# Patient Record
Sex: MALE | Employment: UNEMPLOYED
[De-identification: names, ages, dates, MRNs, and addresses within clinical notes are randomized per-mention and may not be internally consistent; named-entity substitution may affect disease eponyms.]

---

## 2022-04-21 ENCOUNTER — OFFICE VISIT (OUTPATIENT)
Dept: INTERNAL MEDICINE | Facility: OTHER | Age: 27
End: 2022-04-21
Payer: MEDICAID

## 2022-04-21 VITALS
SYSTOLIC BLOOD PRESSURE: 120 MMHG | HEART RATE: 65 BPM | WEIGHT: 191 LBS | BODY MASS INDEX: 27.35 KG/M2 | OXYGEN SATURATION: 96 % | TEMPERATURE: 98.1 F | DIASTOLIC BLOOD PRESSURE: 79 MMHG | HEIGHT: 70 IN

## 2022-04-21 DIAGNOSIS — R22.31 NODULE OF FINGER OF RIGHT HAND: ICD-10-CM

## 2022-04-21 DIAGNOSIS — E66.3 OVERWEIGHT: ICD-10-CM

## 2022-04-21 DIAGNOSIS — Z00.00 ANNUAL PHYSICAL EXAM: ICD-10-CM

## 2022-04-21 DIAGNOSIS — H35.60: ICD-10-CM

## 2022-04-21 DIAGNOSIS — Z87.891 HISTORY OF SMOKING FOR 6-10 YEARS: ICD-10-CM

## 2022-04-21 PROCEDURE — 99204 OFFICE O/P NEW MOD 45 MIN: CPT | Mod: GC | Performed by: STUDENT IN AN ORGANIZED HEALTH CARE EDUCATION/TRAINING PROGRAM

## 2022-04-21 ASSESSMENT — ENCOUNTER SYMPTOMS
EYE DISCHARGE: 0
DEPRESSION: 0
BLURRED VISION: 0
WEIGHT LOSS: 0
BACK PAIN: 0
SENSORY CHANGE: 0
PHOTOPHOBIA: 0
WEAKNESS: 1
EYE PAIN: 0
DOUBLE VISION: 0
EYE REDNESS: 0

## 2022-04-21 ASSESSMENT — PATIENT HEALTH QUESTIONNAIRE - PHQ9: CLINICAL INTERPRETATION OF PHQ2 SCORE: 0

## 2022-04-21 ASSESSMENT — LIFESTYLE VARIABLES: SUBSTANCE_ABUSE: 0

## 2022-04-21 NOTE — ASSESSMENT & PLAN NOTE
Patient had piece of metal that got in his eye, needed retinal surgery 1/10/2022 [facility] in [place]  -recommended patient get a PCP    Plan:  -Ophtho referral placed to [facility]

## 2022-04-21 NOTE — PROGRESS NOTES
New Patient    Shaheen Bone is a 26 y.o. Marshallese-speaking male with a PMHx of overweight, prior smoker, and traumatic left eye damage s/p retinal surgery 1/2022 who presents today to establish care.    HPI:     Left Eye:  -Patient states that a piece of metal flew into his eye after work and caused significant damage, he had to go to  Retina and they performed surgery to fix it. However, to continue there he was recommended to establish with a PCP for a referral. His vision is doing well now and he has no complaints.    Finger:  -Patient complains of a nodule in his right third finger over the lateral palmar aspect of the proximal finger over the last several months that initially was causing a trigger finger type inability to extend the hand, but has since decreased in both pain and size and now has no limited ROM and only has pain to compression of the nodule.    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 Additional pertinent symptoms as noted below.    Review of Systems   Constitutional: Negative for malaise/fatigue and weight loss.   Eyes: Negative for blurred vision, double vision, photophobia, pain, discharge and redness.   Musculoskeletal: Negative for back pain and joint pain.   Skin: Negative for itching and rash.   Neurological: Positive for weakness. Negative for sensory change.   Psychiatric/Behavioral: Negative for depression and substance abuse.       Patient Active Problem List    Diagnosis Date Noted   • Traumatic hemorrhage of retina s/p retinal surgery 1/2022 04/21/2022   • Nodule of finger of right hand 04/21/2022          No current outpatient medications on file.     No current facility-administered medications for this visit.     Social History  "    Socioeconomic History   • Marital status: Unknown     Spouse name: Not on file   • Number of children: Not on file   • Years of education: Not on file   • Highest education level: Not on file   Occupational History   • Not on file   Tobacco Use   • Smoking status: Former Smoker     Packs/day: 1.00     Years: 7.00     Pack years: 7.00     Types: Cigarettes   • Smokeless tobacco: Never Used   • Tobacco comment: quit 2019   Vaping Use   • Vaping Use: Never used   Substance and Sexual Activity   • Alcohol use: Never   • Drug use: Never   • Sexual activity: Not on file   Other Topics Concern   • Not on file   Social History Narrative   • Not on file     Social Determinants of Health     Financial Resource Strain: Not on file   Food Insecurity: Not on file   Transportation Needs: Not on file   Physical Activity: Not on file   Stress: Not on file   Social Connections: Not on file   Intimate Partner Violence: Not on file   Housing Stability: Not on file   -, lives in [facility],  with 2 boys at 8 and 3 years old    Family History   Problem Relation Age of Onset   • Thyroid Mother         abnormality, unknown type of problem   • Cancer Father         skin cancer on his foot that was removed, unknown type   • No Known Problems Sister    • No Known Problems Son      Patient has no known allergies.    /79 (BP Location: Left arm, Patient Position: Sitting, BP Cuff Size: Adult)   Pulse 65   Temp 36.7 °C (98.1 °F) (Temporal)   Ht 1.765 m (5' 9.5\")   Wt 86.6 kg (191 lb)   SpO2 96%   BMI 27.80 kg/m²     Physical Exam  Physical Exam  Constitutional:       General: He is not in acute distress.     Appearance: Normal appearance.   HENT:      Head: Normocephalic and atraumatic.      Mouth/Throat:      Mouth: Mucous membranes are moist.      Pharynx: Oropharynx is clear. No oropharyngeal exudate or posterior oropharyngeal erythema.   Eyes:      Extraocular Movements: Extraocular movements intact.      " Conjunctiva/sclera: Conjunctivae normal.      Pupils: Pupils are equal, round, and reactive to light.      Comments: No abnormality post surgically   Neck:      Comments: No thyromegaly  Cardiovascular:      Rate and Rhythm: Normal rate and regular rhythm.      Heart sounds: No murmur heard.    No gallop.   Pulmonary:      Effort: Pulmonary effort is normal. No respiratory distress.      Breath sounds: Normal breath sounds. No wheezing or rales.   Abdominal:      General: Abdomen is flat. Bowel sounds are normal. There is no distension.      Palpations: Abdomen is soft.      Tenderness: There is no abdominal tenderness.   Musculoskeletal:         General: Deformity (Patient's finger is slightly curved because of the pull on his third finger from the nodule but he has no limited ROM, tender only to compression, can feel the nodule moving up and down with finger F/E) present. No swelling. Normal range of motion.      Cervical back: Neck supple. No tenderness.   Lymphadenopathy:      Cervical: No cervical adenopathy.   Skin:     General: Skin is warm and dry.      Capillary Refill: Capillary refill takes less than 2 seconds.      Findings: No erythema or rash.   Neurological:      General: No focal deficit present.      Mental Status: He is alert and oriented to person, place, and time.   Psychiatric:         Mood and Affect: Mood normal.         Behavior: Behavior normal.          Assessment and Plan    Traumatic hemorrhage of retina s/p retinal surgery 1/2022  Patient had piece of metal that got in his eye, needed retinal surgery 1/10/2022 [facility] in [place]  -recommended patient get a PCP    Plan:  -Ophtho referral placed to [facility]    Nodule of finger of right hand  6 months, initially in the morning issues opening hand without inciting trauma, now better but has a nodule in his right hand third finger  -likely a nodule along the tendon sheath, feels less like ganglion cyst. Likely from repetitive trauma at work  and not affecting functioning    Plan:  -Continue to monitor, If affecting functioning will refer to ortho for removal/injection    Overweight  BMI >25  -discussed weight loss, patient exercises. Did not discuss diet at length, if continues to have elevated weight would benefit from in depth discussion    Annual physical exam  Patient appears very healthy  -need baseline labs to establish no underlying disease process, will start with CBC, CMP, lipid profile  -no thyroid labs at this time as asymptomatic and well    Follow up: 6 months to establish with new resident, sooner if lab abnormality    Signed by: Lopez Benitez D.O.

## 2022-04-21 NOTE — ASSESSMENT & PLAN NOTE
6 months, initially in the morning issues opening hand without inciting trauma, now better but has a nodule in his right hand third finger  -likely a nodule along the tendon sheath, feels less like ganglion cyst. Likely from repetitive trauma at work and not affecting functioning    Plan:  -Continue to monitor, If affecting functioning will refer to ortho for removal/injection

## 2022-04-21 NOTE — PATIENT INSTRUCTIONS
Puedes volver en seis meses por hablar sobre tus problemas y establecer con un otro residente aqui.    Por favor, si puedes hacer en analisis de la cherelle en la proxima semana, puedo enviar un mensaje por my chart si es normal, voy a llamar si es abnormal.    Con el dedo y la ansiedad, vamos a observar si estas problems cambiar en frequencia o intensidad. Si tienes problems, llamar a nosotros para mic cruz por discutirlo.

## 2022-04-22 DIAGNOSIS — E66.3 OVERWEIGHT: ICD-10-CM

## 2022-04-22 DIAGNOSIS — Z00.00 ANNUAL PHYSICAL EXAM: ICD-10-CM

## 2022-04-22 NOTE — ASSESSMENT & PLAN NOTE
Patient appears very healthy  -need baseline labs to establish no underlying disease process, will start with CBC, CMP, lipid profile  -no thyroid labs at this time as asymptomatic and well

## 2023-04-25 ENCOUNTER — APPOINTMENT (OUTPATIENT)
Dept: URGENT CARE | Facility: PHYSICIAN GROUP | Age: 28
End: 2023-04-25
Payer: MEDICAID

## 2023-04-26 ENCOUNTER — OFFICE VISIT (OUTPATIENT)
Dept: URGENT CARE | Facility: CLINIC | Age: 28
End: 2023-04-26
Payer: MEDICAID

## 2023-04-26 ENCOUNTER — APPOINTMENT (OUTPATIENT)
Dept: RADIOLOGY | Facility: MEDICAL CENTER | Age: 28
End: 2023-04-26
Attending: EMERGENCY MEDICINE
Payer: MEDICAID

## 2023-04-26 ENCOUNTER — HOSPITAL ENCOUNTER (EMERGENCY)
Facility: MEDICAL CENTER | Age: 28
End: 2023-04-26
Attending: EMERGENCY MEDICINE
Payer: MEDICAID

## 2023-04-26 VITALS
HEART RATE: 102 BPM | BODY MASS INDEX: 27.75 KG/M2 | RESPIRATION RATE: 14 BRPM | OXYGEN SATURATION: 97 % | SYSTOLIC BLOOD PRESSURE: 102 MMHG | HEIGHT: 70 IN | DIASTOLIC BLOOD PRESSURE: 74 MMHG | TEMPERATURE: 97.9 F | WEIGHT: 193.8 LBS

## 2023-04-26 VITALS
WEIGHT: 194.89 LBS | SYSTOLIC BLOOD PRESSURE: 126 MMHG | OXYGEN SATURATION: 99 % | RESPIRATION RATE: 16 BRPM | TEMPERATURE: 97.1 F | BODY MASS INDEX: 27.9 KG/M2 | DIASTOLIC BLOOD PRESSURE: 84 MMHG | HEART RATE: 93 BPM | HEIGHT: 70 IN

## 2023-04-26 DIAGNOSIS — R10.31 RIGHT LOWER QUADRANT ABDOMINAL PAIN: ICD-10-CM

## 2023-04-26 DIAGNOSIS — F41.1 GENERALIZED ANXIETY DISORDER: ICD-10-CM

## 2023-04-26 DIAGNOSIS — R10.31 RIGHT LOWER QUADRANT PAIN: Primary | ICD-10-CM

## 2023-04-26 DIAGNOSIS — Z00.00 HEALTH CARE MAINTENANCE: ICD-10-CM

## 2023-04-26 LAB
ALBUMIN SERPL BCP-MCNC: 4.6 G/DL (ref 3.2–4.9)
ALBUMIN/GLOB SERPL: 1.4 G/DL
ALP SERPL-CCNC: 126 U/L (ref 30–99)
ALT SERPL-CCNC: 23 U/L (ref 2–50)
ANION GAP SERPL CALC-SCNC: 12 MMOL/L (ref 7–16)
APPEARANCE UR: CLEAR
AST SERPL-CCNC: 18 U/L (ref 12–45)
BASOPHILS # BLD AUTO: 0.4 % (ref 0–1.8)
BASOPHILS # BLD: 0.04 K/UL (ref 0–0.12)
BILIRUB SERPL-MCNC: 0.4 MG/DL (ref 0.1–1.5)
BILIRUB UR QL STRIP.AUTO: NEGATIVE
BUN SERPL-MCNC: 17 MG/DL (ref 8–22)
CALCIUM ALBUM COR SERPL-MCNC: 8.9 MG/DL (ref 8.5–10.5)
CALCIUM SERPL-MCNC: 9.4 MG/DL (ref 8.5–10.5)
CHLORIDE SERPL-SCNC: 103 MMOL/L (ref 96–112)
CO2 SERPL-SCNC: 23 MMOL/L (ref 20–33)
COLOR UR: YELLOW
CREAT SERPL-MCNC: 0.91 MG/DL (ref 0.5–1.4)
EOSINOPHIL # BLD AUTO: 0.04 K/UL (ref 0–0.51)
EOSINOPHIL NFR BLD: 0.4 % (ref 0–6.9)
ERYTHROCYTE [DISTWIDTH] IN BLOOD BY AUTOMATED COUNT: 40.6 FL (ref 35.9–50)
GFR SERPLBLD CREATININE-BSD FMLA CKD-EPI: 117 ML/MIN/1.73 M 2
GLOBULIN SER CALC-MCNC: 3.2 G/DL (ref 1.9–3.5)
GLUCOSE SERPL-MCNC: 89 MG/DL (ref 65–99)
GLUCOSE UR STRIP.AUTO-MCNC: NEGATIVE MG/DL
HCT VFR BLD AUTO: 49.2 % (ref 42–52)
HGB BLD-MCNC: 16.5 G/DL (ref 14–18)
IMM GRANULOCYTES # BLD AUTO: 0.02 K/UL (ref 0–0.11)
IMM GRANULOCYTES NFR BLD AUTO: 0.2 % (ref 0–0.9)
KETONES UR STRIP.AUTO-MCNC: NEGATIVE MG/DL
LEUKOCYTE ESTERASE UR QL STRIP.AUTO: NEGATIVE
LIPASE SERPL-CCNC: 28 U/L (ref 11–82)
LYMPHOCYTES # BLD AUTO: 2.33 K/UL (ref 1–4.8)
LYMPHOCYTES NFR BLD: 20.7 % (ref 22–41)
MCH RBC QN AUTO: 30.1 PG (ref 27–33)
MCHC RBC AUTO-ENTMCNC: 33.5 G/DL (ref 33.7–35.3)
MCV RBC AUTO: 89.8 FL (ref 81.4–97.8)
MICRO URNS: NORMAL
MONOCYTES # BLD AUTO: 0.77 K/UL (ref 0–0.85)
MONOCYTES NFR BLD AUTO: 6.9 % (ref 0–13.4)
NEUTROPHILS # BLD AUTO: 8.03 K/UL (ref 1.82–7.42)
NEUTROPHILS NFR BLD: 71.4 % (ref 44–72)
NITRITE UR QL STRIP.AUTO: NEGATIVE
NRBC # BLD AUTO: 0 K/UL
NRBC BLD-RTO: 0 /100 WBC
PH UR STRIP.AUTO: 5 [PH] (ref 5–8)
PLATELET # BLD AUTO: 247 K/UL (ref 164–446)
PMV BLD AUTO: 9.9 FL (ref 9–12.9)
POTASSIUM SERPL-SCNC: 3.8 MMOL/L (ref 3.6–5.5)
PROT SERPL-MCNC: 7.8 G/DL (ref 6–8.2)
PROT UR QL STRIP: NEGATIVE MG/DL
RBC # BLD AUTO: 5.48 M/UL (ref 4.7–6.1)
RBC UR QL AUTO: NEGATIVE
SODIUM SERPL-SCNC: 138 MMOL/L (ref 135–145)
SP GR UR STRIP.AUTO: >=1.03
UROBILINOGEN UR STRIP.AUTO-MCNC: 0.2 MG/DL
WBC # BLD AUTO: 11.2 K/UL (ref 4.8–10.8)

## 2023-04-26 PROCEDURE — 74177 CT ABD & PELVIS W/CONTRAST: CPT

## 2023-04-26 PROCEDURE — 80053 COMPREHEN METABOLIC PANEL: CPT

## 2023-04-26 PROCEDURE — 36415 COLL VENOUS BLD VENIPUNCTURE: CPT

## 2023-04-26 PROCEDURE — 81003 URINALYSIS AUTO W/O SCOPE: CPT

## 2023-04-26 PROCEDURE — 99284 EMERGENCY DEPT VISIT MOD MDM: CPT

## 2023-04-26 PROCEDURE — 83690 ASSAY OF LIPASE: CPT

## 2023-04-26 PROCEDURE — 99214 OFFICE O/P EST MOD 30 MIN: CPT | Performed by: NURSE PRACTITIONER

## 2023-04-26 PROCEDURE — 700117 HCHG RX CONTRAST REV CODE 255: Performed by: EMERGENCY MEDICINE

## 2023-04-26 PROCEDURE — 85025 COMPLETE CBC W/AUTO DIFF WBC: CPT

## 2023-04-26 RX ADMIN — IOHEXOL 100 ML: 350 INJECTION, SOLUTION INTRAVENOUS at 19:50

## 2023-04-26 ASSESSMENT — ENCOUNTER SYMPTOMS
ABDOMINAL PAIN: 1
CHILLS: 0
FLANK PAIN: 0
FEVER: 0

## 2023-04-26 NOTE — PROGRESS NOTES
"Subjective:     Shaheen Bone is a 27 y.o. male who presents for Abdominal Pain (Lower R side of abdomen x 1 week.  Pt. Would like a referral to Mental Health for Anxiety and PCP for care. )      Abdominal Pain  Pertinent negatives include no dysuria, fever, frequency or hematuria.   Pt presents for evaluation of a new problem.  Shaheen is a very pleasant 27-year-old male presents urgent care today with complaints of right lower quadrant abdominal pain that has been ongoing for the past 1 week.  He does note that his testicles are moving up and down.  His pain is rated as a 3/10.  He denies any nausea, vomiting, diarrhea, fever or chills.  He has not use any medication for the relief of his discomfort.   Review of Systems   Constitutional:  Negative for chills and fever.   Gastrointestinal:  Positive for abdominal pain.   Genitourinary:  Negative for dysuria, flank pain, frequency, hematuria and urgency.     PMH: No past medical history on file.  ALLERGIES: No Known Allergies  SURGHX: No past surgical history on file.  SOCHX:   Social History     Socioeconomic History    Marital status: Unknown   Tobacco Use    Smoking status: Former     Packs/day: 1.00     Years: 7.00     Pack years: 7.00     Types: Cigarettes    Smokeless tobacco: Never    Tobacco comments:     quit 2019   Vaping Use    Vaping Use: Never used   Substance and Sexual Activity    Alcohol use: Never    Drug use: Never     FH:   Family History   Problem Relation Age of Onset    Thyroid Mother         abnormality, unknown type of problem    Cancer Father         skin cancer on his foot that was removed, unknown type    No Known Problems Sister     No Known Problems Son          Objective:   /74   Pulse (!) 102   Temp 36.6 °C (97.9 °F) (Temporal)   Resp 14   Ht 1.778 m (5' 10\")   Wt 87.9 kg (193 lb 12.8 oz)   SpO2 97%   BMI 27.81 kg/m²     Physical Exam  Vitals and nursing note reviewed.   Constitutional:       General: He is not in " acute distress.     Appearance: Normal appearance. He is normal weight. He is not ill-appearing or toxic-appearing.   HENT:      Head: Normocephalic.      Right Ear: External ear normal.      Left Ear: External ear normal.      Nose: Nose normal.      Mouth/Throat:      Mouth: Mucous membranes are moist.   Eyes:      General:         Right eye: No discharge.         Left eye: No discharge.      Extraocular Movements: Extraocular movements intact.      Conjunctiva/sclera: Conjunctivae normal.      Pupils: Pupils are equal, round, and reactive to light.   Pulmonary:      Effort: Pulmonary effort is normal.      Breath sounds: Normal breath sounds.   Abdominal:      General: Abdomen is flat.      Tenderness: There is abdominal tenderness in the right lower quadrant. There is no guarding.   Musculoskeletal:         General: Normal range of motion.      Cervical back: Normal range of motion and neck supple. No rigidity.   Lymphadenopathy:      Cervical: No cervical adenopathy.   Skin:     General: Skin is warm and dry.   Neurological:      General: No focal deficit present.      Mental Status: He is alert and oriented to person, place, and time. Mental status is at baseline.   Psychiatric:         Mood and Affect: Mood normal.         Behavior: Behavior normal.         Judgment: Judgment normal.       Assessment/Plan:   Assessment    1. Right lower quadrant pain  US-APPENDIX    US-INGUINAL HERNIA      2. Health care maintenance  Referral back to [facility] PCP      3. Generalized anxiety disorder  Referral to Behavioral Health      Patient was referred to follow-up with behavioral health and PCP for healthcare maintenance and anxiety per request.  Ultrasound ordered stat for evaluation of hernia and or appendicitis.  I will notify him of results.  He is in agreement with plan of care today and  was used for the duration of his visit.    AVS handout given and reviewed with patient. Pt educated on red flags and when  to seek treatment back in ER or UC.

## 2023-04-27 NOTE — ED NOTES
Assumed care of patient. Bedside report received from Karen HAYES. Pt resting in bed. States pain is tolerable.    PST RN Clinic Note     Patient educated to increase physical activity by at least 15 minutes, 3 times a week prior to surgery. Activity log given to patient. Patient states plan for increasing activity includes : Encouraged patient to walk 15 minutes daily until surgery.    Patient educated and instructed in stir-up regimen: deep breathing exercises, coughing, positioning, mobilization and pain management.  Instructed patient on use of Incentive Spirometer (IS) to use 10 times per hour while awake for respiratory management post-operatively.      Preoperative Chlorhexidine Gluconate skin prep (CHG wash) given to patient with instructions to use evening before surgery.    High carbohydrate pre-surgical drink (Ensure Clear) given to patient.  Verbal and written instructions reviewed to drink 1 hour prior to arriving to hospital day of surgery.     CLEAR program   Reviewed program with patient to prevent post-op pneumonia:   C- Clean mouth, brush and floss, use Chlorhexidine Gluconate (CHG) mouth wash x 3 days  L- Lungs ( IS and direct cough & deep breathing hourly)  E- Elevate HOB 30 degrees  A- Ambulate & elevate feet, eat meals in chair  R- Report document bundle    Reviewed pre-op surgical education with patient and family on:  -Fasting time  -Diet and nutrition goals  -Pain control  -Mobility  -Showering  -Deep breathing and coughing  -Discharge criteria    Procedural questions from patient addressed      Discharge planning discussed with patient   The Day Before Discharge Reminders:  1. Notify the person picking you up when you expect to go home.  2. Send home any extra personal items and flowers with family or friends.  3. Goal is to discharge by 11:00 a.m. whenever possible.  Emphasis placed on support system including responsible adult to assist patient in care when discharged from hospital.          Patient's plan includes: Home with spouse

## 2023-04-27 NOTE — ED NOTES
AVS discussed with patient via . Ambulatory with steady gait to lobby. Return precautions discussed

## 2023-04-27 NOTE — ED TRIAGE NOTES
Chief Complaint   Patient presents with    Abdominal Pain     C/o rlg abdominal pain x 4 days. Describes pain as pressure. Rates as 3/10. Denies dysuria. Denies n/v/d. Sent here from urgent care to get US and for further evaluation     NAD.   Vitals:    04/26/23 1717   BP: (!) 146/82   Pulse: 91   Resp: 16   Temp: 36.2 °C (97.1 °F)   SpO2: 97%     [place] speaking only, ipad  used [employer] Twitter Dashboard [id number]

## 2023-04-27 NOTE — ED PROVIDER NOTES
ED Provider Note    CHIEF COMPLAINT  Chief Complaint   Patient presents with    Abdominal Pain     C/o rlg abdominal pain x 4 days. Describes pain as pressure. Rates as 3/10. Denies dysuria. Denies n/v/d. Sent here from urgent care to get US and for further evaluation       EXTERNAL RECORDS REVIEWED  Other reviewed the patient's note from the urgent care today as the patient presented there with right lower quadrant discomfort.  They recommended an ultrasound to look for appendicitis as well as a potential inguinal hernia.    HPI/ROS  LIMITATION TO HISTORY   Select: Language utilize the iPad ,  Used       Shaheen Sulaiman Bone is a 27 y.o. male who presents with right lower quadrant abdominal pain.  The patient states the pain has been progressively getting worse over the last 3 to 4 days.  He is unaware of any exacerbating or relieving factors.  Describes the pain as sharp.  He does not have any dysuria nor hematuria.  He has not had any diarrhea.  He does not have any nausea or vomiting.  He has not had any prior abdominal surgeries.    PAST MEDICAL HISTORY       SURGICAL HISTORY  patient denies any surgical history    FAMILY HISTORY  Family History   Problem Relation Age of Onset    Thyroid Mother         abnormality, unknown type of problem    Cancer Father         skin cancer on his foot that was removed, unknown type    No Known Problems Sister     No Known Problems Son        SOCIAL HISTORY  Social History     Tobacco Use    Smoking status: Former     Packs/day: 1.00     Years: 7.00     Pack years: 7.00     Types: Cigarettes    Smokeless tobacco: Never    Tobacco comments:     quit 2019   Vaping Use    Vaping Use: Never used   Substance and Sexual Activity    Alcohol use: Never    Drug use: Never    Sexual activity: Not on file       CURRENT MEDICATIONS  Home Medications       Reviewed by Hina Mcconnell R.N. (Registered Nurse) on 04/26/23 at 6848  Med List Status: Partial     Medication  "Last Dose Status        Patient Carson Taking any Medications                           ALLERGIES  No Known Allergies    PHYSICAL EXAM  VITAL SIGNS: BP (!) 146/82   Pulse 91   Temp 36.2 °C (97.1 °F) (Temporal)   Resp 16   Ht 1.778 m (5' 10\")   Wt 88.4 kg (194 lb 14.2 oz)   SpO2 97%   BMI 27.96 kg/m²    General the patient does not appear toxic    HEENT unremarkable    Pulmonary chest clear to auscultation bilaterally    Cardiovascular S1-S2 with a regular rate and rhythm    GI the patient has right lower quadrant discomfort to deep palpation, no rebound, no guarding, no distention    Skin no pallor or cyanosis    Extremities atraumatic    Neurologic examination is grossly intact    DIAGNOSTIC STUDIES   Results for orders placed or performed during the hospital encounter of 04/26/23   CBC WITH DIFFERENTIAL   Result Value Ref Range    WBC 11.2 (H) 4.8 - 10.8 K/uL    RBC 5.48 4.70 - 6.10 M/uL    Hemoglobin 16.5 14.0 - 18.0 g/dL    Hematocrit 49.2 42.0 - 52.0 %    MCV 89.8 81.4 - 97.8 fL    MCH 30.1 27.0 - 33.0 pg    MCHC 33.5 (L) 33.7 - 35.3 g/dL    RDW 40.6 35.9 - 50.0 fL    Platelet Count 247 164 - 446 K/uL    MPV 9.9 9.0 - 12.9 fL    Neutrophils-Polys 71.40 44.00 - 72.00 %    Lymphocytes 20.70 (L) 22.00 - 41.00 %    Monocytes 6.90 0.00 - 13.40 %    Eosinophils 0.40 0.00 - 6.90 %    Basophils 0.40 0.00 - 1.80 %    Immature Granulocytes 0.20 0.00 - 0.90 %    Nucleated RBC 0.00 /100 WBC    Neutrophils (Absolute) 8.03 (H) 1.82 - 7.42 K/uL    Lymphs (Absolute) 2.33 1.00 - 4.80 K/uL    Monos (Absolute) 0.77 0.00 - 0.85 K/uL    Eos (Absolute) 0.04 0.00 - 0.51 K/uL    Baso (Absolute) 0.04 0.00 - 0.12 K/uL    Immature Granulocytes (abs) 0.02 0.00 - 0.11 K/uL    NRBC (Absolute) 0.00 K/uL   COMP METABOLIC PANEL   Result Value Ref Range    Sodium 138 135 - 145 mmol/L    Potassium 3.8 3.6 - 5.5 mmol/L    Chloride 103 96 - 112 mmol/L    Co2 23 20 - 33 mmol/L    Anion Gap 12.0 7.0 - 16.0    Glucose 89 65 - 99 mg/dL    Bun 17 " 8 - 22 mg/dL    Creatinine 0.91 0.50 - 1.40 mg/dL    Calcium 9.4 8.5 - 10.5 mg/dL    AST(SGOT) 18 12 - 45 U/L    ALT(SGPT) 23 2 - 50 U/L    Alkaline Phosphatase 126 (H) 30 - 99 U/L    Total Bilirubin 0.4 0.1 - 1.5 mg/dL    Albumin 4.6 3.2 - 4.9 g/dL    Total Protein 7.8 6.0 - 8.2 g/dL    Globulin 3.2 1.9 - 3.5 g/dL    A-G Ratio 1.4 g/dL   LIPASE   Result Value Ref Range    Lipase 28 11 - 82 U/L   URINALYSIS    Specimen: Urine   Result Value Ref Range    Color Yellow     Character Clear     Specific Gravity >=1.030 <1.035    Ph 5.0 5.0 - 8.0    Glucose Negative Negative mg/dL    Ketones Negative Negative mg/dL    Protein Negative Negative mg/dL    Bilirubin Negative Negative    Urobilinogen, Urine 0.2 Negative    Nitrite Negative Negative    Leukocyte Esterase Negative Negative    Occult Blood Negative Negative    Micro Urine Req see below    CORRECTED CALCIUM   Result Value Ref Range    Correct Calcium 8.9 8.5 - 10.5 mg/dL   ESTIMATED GFR   Result Value Ref Range    GFR (CKD-EPI) 117 >60 mL/min/1.73 m 2     RADIOLOGY  I have independently interpreted the diagnostic imaging associated with this visit and am waiting the final reading from the radiologist.   My preliminary interpretation is as follows: The scan was reviewed by myself and there is no evidence of inflammation in the right lower quadrant  Radiologist interpretation:   CT-ABDOMEN-PELVIS WITH   Final Result      Negative abdomen and pelvis CT. No evidence of acute appendicitis or hydronephrosis            COURSE & MEDICAL DECISION MAKING    ED Observation Status? Yes; I am placing the patient in to an observation status due to a diagnostic uncertainty as well as therapeutic intensity. Patient placed in observation status at 6:18 PM, 4/26/2023.     Observation plan is as follows: The patient presents with undifferentiated right lower quadrant pain I will perform a CT scan as this will evaluate for both hernia and potentially appendicitis.  The patient will  have laboratory analysis.  Does not want pain medication at this time.  The patient be admitted to the emergency department under observation status as we are awaiting work-up.    2015 the patient was reexamined and his abdomen continues to be nonsurgical.  I do not have a clear source.  CT scan does not show any evidence intra-abdominal inflammation.  The patient's urinalysis does not support cystitis and pyelonephritis.  He does have a slight leukocytosis but again the CT scan does not show any evidence intra-abdominal inflammation.  The patient will be treated with Tylenol as well as oral hydration.  I like the patient to recheck in 48 hours if he is not better and sooner if worse.    Upon Reevaluation, the patient's condition has: Improved; and will be discharged.    Patient discharged from ED Observation status at 2015 (Time) 4/26/23 (Date).       FINAL DIAGNOSIS  Right lower quadrant abdominal pain    Disposition  The patient will be discharged in stable condition       Electronically signed by: Nicholas Carlisle M.D., 4/26/2023 6:16 PM

## 2023-05-04 ENCOUNTER — TELEPHONE (OUTPATIENT)
Dept: HEALTH INFORMATION MANAGEMENT | Facility: OTHER | Age: 28
End: 2023-05-04
Payer: MEDICAID

## 2025-05-04 ENCOUNTER — OFFICE VISIT (OUTPATIENT)
Dept: URGENT CARE | Facility: PHYSICIAN GROUP | Age: 30
End: 2025-05-04
Payer: MEDICAID

## 2025-05-04 VITALS
DIASTOLIC BLOOD PRESSURE: 82 MMHG | WEIGHT: 212 LBS | TEMPERATURE: 99 F | SYSTOLIC BLOOD PRESSURE: 120 MMHG | HEART RATE: 85 BPM | RESPIRATION RATE: 18 BRPM | OXYGEN SATURATION: 96 % | BODY MASS INDEX: 30.42 KG/M2

## 2025-05-04 DIAGNOSIS — J98.8 RESPIRATORY TRACT INFECTION: ICD-10-CM

## 2025-05-04 PROCEDURE — 3074F SYST BP LT 130 MM HG: CPT

## 2025-05-04 PROCEDURE — 99213 OFFICE O/P EST LOW 20 MIN: CPT

## 2025-05-04 PROCEDURE — 3079F DIAST BP 80-89 MM HG: CPT

## 2025-05-04 RX ORDER — DEXTROMETHORPHAN HYDROBROMIDE AND PROMETHAZINE HYDROCHLORIDE 15; 6.25 MG/5ML; MG/5ML
5 SYRUP ORAL
Qty: 25 ML | Refills: 0 | Status: SHIPPED | OUTPATIENT
Start: 2025-05-04 | End: 2025-05-09

## 2025-05-04 RX ORDER — FLUTICASONE PROPIONATE 50 MCG
1 SPRAY, SUSPENSION (ML) NASAL DAILY
Qty: 16 G | Refills: 0 | Status: SHIPPED | OUTPATIENT
Start: 2025-05-04

## 2025-05-04 RX ORDER — BENZONATATE 100 MG/1
100 CAPSULE ORAL 3 TIMES DAILY PRN
Qty: 30 CAPSULE | Refills: 0 | Status: SHIPPED | OUTPATIENT
Start: 2025-05-04

## 2025-05-04 RX ORDER — AZITHROMYCIN 250 MG/1
TABLET, FILM COATED ORAL
Qty: 6 TABLET | Refills: 0 | Status: SHIPPED | OUTPATIENT
Start: 2025-05-04 | End: 2025-05-15

## 2025-05-04 ASSESSMENT — ENCOUNTER SYMPTOMS
VOMITING: 0
ABDOMINAL PAIN: 0
COUGH: 1
FEVER: 0
NAUSEA: 0
CHILLS: 0
SORE THROAT: 1
SHORTNESS OF BREATH: 0

## 2025-05-04 NOTE — LETTER
May 4, 2025    To Whom It May Concern:         This is confirmation that Shaheen Bone attended his scheduled appointment with JAVI Ramirez on 5/04/25. Please excuse from any missed work.           If you have any questions please do not hesitate to call me at the phone number listed below.    Sincerely,          LUCY Ramirez.  [phone]

## 2025-05-04 NOTE — PROGRESS NOTES
Chief Complaint   Patient presents with    Cough     X1 week        HISTORY OF PRESENT ILLNESS: Patient is a pleasant 29 y.o. male who presents to urgent care today ongoing cold and cough like symptoms for the last week.  Patient denies any shortness of breath, no noted fevers.  Taking OTC medications with little to no relief.    Patient Active Problem List    Diagnosis Date Noted    Traumatic hemorrhage of retina s/p retinal surgery 1/2022 04/21/2022    Nodule of finger of right hand 04/21/2022    Overweight 04/21/2022    History of smoking for 6-10 years 04/21/2022    Annual physical exam 04/21/2022       Allergies:Patient has no known allergies.    Current Outpatient Medications Ordered in Epic   Medication Sig Dispense Refill    fluticasone (FLONASE) 50 MCG/ACT nasal spray Administer 1 Spray into affected nostril(S) every day. 16 g 0    benzonatate (TESSALON) 100 MG Cap Take 1 Capsule by mouth 3 times a day as needed for Cough. 30 Capsule 0    promethazine-dextromethorphan (PROMETHAZINE-DM) 6.25-15 MG/5ML syrup Take 5 mL by mouth 1 time a day as needed for Cough (at night only) for up to 5 days. 25 mL 0    azithromycin (ZITHROMAX) 250 MG Tab Take 2 tabs today, then 1 tab p.o. daily 6 Tablet 0     No current Epic-ordered facility-administered medications on file.       History reviewed. No pertinent past medical history.    Social History     Tobacco Use    Smoking status: Former     Current packs/day: 1.00     Average packs/day: 1 pack/day for 7.0 years (7.0 ttl pk-yrs)     Types: Cigarettes    Smokeless tobacco: Never    Tobacco comments:     quit 2019   Vaping Use    Vaping status: Never Used   Substance Use Topics    Alcohol use: Never    Drug use: Never       Family Status   Relation Name Status    Mo  (Not Specified)    Fa  (Not Specified)    Sis  (Not Specified)    Son  Other        2 sons both healthy   No partnership data on file     Family History   Problem Relation Age of Onset    Thyroid Mother          abnormality, unknown type of problem    Cancer Father         skin cancer on his foot that was removed, unknown type    No Known Problems Sister     No Known Problems Son        Review of Systems   Constitutional:  Negative for chills, fever and malaise/fatigue.   HENT:  Positive for congestion and sore throat. Negative for ear pain.    Respiratory:  Positive for cough. Negative for shortness of breath.    Gastrointestinal:  Negative for abdominal pain, nausea and vomiting.   Skin:  Negative for rash.       Exam:  /82 (BP Location: Right arm, Patient Position: Sitting, BP Cuff Size: Adult)   Pulse 85   Temp 37.2 °C (99 °F) (Temporal)   Resp 18   Wt 96.2 kg (212 lb)   SpO2 96%   Physical Exam  Vitals reviewed.   Constitutional:       General: He is not in acute distress.     Appearance: Normal appearance. He is normal weight.   HENT:      Head: Normocephalic.      Right Ear: Tympanic membrane and ear canal normal. There is no impacted cerumen. Tympanic membrane is not injected or erythematous.      Left Ear: Tympanic membrane and ear canal normal. There is no impacted cerumen. Tympanic membrane is not injected or erythematous.      Mouth/Throat:      Mouth: Mucous membranes are moist.      Pharynx: Oropharynx is clear. Posterior oropharyngeal erythema present. No oropharyngeal exudate.      Tonsils: No tonsillar exudate. 2+ on the right. 2+ on the left.   Eyes:      General:         Right eye: No discharge.         Left eye: No discharge.      Extraocular Movements: Extraocular movements intact.      Conjunctiva/sclera: Conjunctivae normal.      Pupils: Pupils are equal, round, and reactive to light.   Cardiovascular:      Rate and Rhythm: Normal rate and regular rhythm.      Pulses: Normal pulses.      Heart sounds: Normal heart sounds. No murmur heard.  Pulmonary:      Effort: Pulmonary effort is normal. No respiratory distress.      Breath sounds: Normal breath sounds. No stridor. No wheezing.       Comments: Positive dry cough  Musculoskeletal:         General: Normal range of motion.      Cervical back: Normal range of motion.   Lymphadenopathy:      Cervical: No cervical adenopathy.   Skin:     General: Skin is warm and dry.      Capillary Refill: Capillary refill takes less than 2 seconds.      Findings: No bruising or rash.   Neurological:      General: No focal deficit present.      Mental Status: He is alert.      Sensory: No sensory deficit.      Motor: No weakness.   Psychiatric:         Mood and Affect: Mood normal.         Behavior: Behavior normal.         Thought Content: Thought content normal.         Judgment: Judgment normal.             Assessment/Plan:  1. Respiratory tract infection  - fluticasone (FLONASE) 50 MCG/ACT nasal spray; Administer 1 Spray into affected nostril(S) every day.  Dispense: 16 g; Refill: 0  - benzonatate (TESSALON) 100 MG Cap; Take 1 Capsule by mouth 3 times a day as needed for Cough.  Dispense: 30 Capsule; Refill: 0  - promethazine-dextromethorphan (PROMETHAZINE-DM) 6.25-15 MG/5ML syrup; Take 5 mL by mouth 1 time a day as needed for Cough (at night only) for up to 5 days.  Dispense: 25 mL; Refill: 0  - azithromycin (ZITHROMAX) 250 MG Tab; Take 2 tabs today, then 1 tab p.o. daily  Dispense: 6 Tablet; Refill: 0    Based on physical exam along with review of systems I did provide Zithromax today as patient has been sick for over a week with no relief from over-the-counter cold medications.  Patient has a noted ongoing dry cough with increased redness and swelling to the back of his throat, concerning for possible strep component.  Zithromax will cover this if this is the case.  Lung sounds are otherwise clear.  Medications in the form of Flonase sent as well.  Patient advised take medication with food, drink plenty of fluids.  [facility] was utilized for the purposes of this appointment.    Supportive care, differential diagnoses, and indications for immediate  follow-up discussed with patient.   Pathogenesis of diagnosis discussed including typical length and natural progression.   Instructed to return to clinic or nearest emergency department for any change in condition, further concerns, or worsening of symptoms.  Patient states understanding of the plan of care and discharge instructions.  Instructed to make an appointment, for follow up, with primary care provider.    Please note that this dictation was created using voice recognition software. I have made every reasonable attempt to correct obvious errors, but I expect that there are errors of grammar and possibly content that I did not discover before finalizing the note.      Nasreen ADEN

## 2025-05-15 ENCOUNTER — OFFICE VISIT (OUTPATIENT)
Dept: URGENT CARE | Facility: PHYSICIAN GROUP | Age: 30
End: 2025-05-15
Payer: COMMERCIAL

## 2025-05-15 VITALS
WEIGHT: 213 LBS | TEMPERATURE: 99.4 F | OXYGEN SATURATION: 99 % | DIASTOLIC BLOOD PRESSURE: 84 MMHG | SYSTOLIC BLOOD PRESSURE: 122 MMHG | BODY MASS INDEX: 30.56 KG/M2 | RESPIRATION RATE: 16 BRPM | HEART RATE: 89 BPM

## 2025-05-15 DIAGNOSIS — R51.9 ACUTE NONINTRACTABLE HEADACHE, UNSPECIFIED HEADACHE TYPE: ICD-10-CM

## 2025-05-15 DIAGNOSIS — M54.2 NECK PAIN ON RIGHT SIDE: Primary | ICD-10-CM

## 2025-05-15 PROCEDURE — 3079F DIAST BP 80-89 MM HG: CPT

## 2025-05-15 PROCEDURE — 99213 OFFICE O/P EST LOW 20 MIN: CPT

## 2025-05-15 PROCEDURE — 3074F SYST BP LT 130 MM HG: CPT

## 2025-05-15 RX ORDER — CYCLOBENZAPRINE HCL 5 MG
5-10 TABLET ORAL NIGHTLY PRN
Qty: 14 TABLET | Refills: 0 | Status: SHIPPED | OUTPATIENT
Start: 2025-05-15

## 2025-05-15 ASSESSMENT — ENCOUNTER SYMPTOMS
HEADACHES: 1
NAUSEA: 0
VOMITING: 0
BLURRED VISION: 0
FEVER: 0
NECK PAIN: 1

## 2025-05-16 NOTE — PROGRESS NOTES
Subjective:     CHIEF COMPLAINT  Chief Complaint   Patient presents with    Headache     X3days, headache       HPI  Shaheen Bone is a very pleasant 29 y.o. male who presents with a right sided headache that has been present for the past 3 days.  He describes the headache as a pulsing/pinching sensation along the right parietal region of his head that seems to come and go.  The headache lasts for approximately 5 to 10 minutes at a time and seems to come on randomly.  He reports that the headache feels similar to a brain-freeze.  He has not had any fevers.  He denies any cough or congestion.  He was sick with a cough and sore throat 1 week ago that have since resolved after being treated with azithromycin.  He does report that he has been feeling the sensation of tension on the right side of his neck and believes that he may be experiencing a tension headache.  He denies any nausea or vomiting.  She denies any vision changes.  He denies a history of migraines.  He has been taking Tylenol as needed.  He does report that his headache has been gradually improving.    REVIEW OF SYSTEMS  Review of Systems   Constitutional:  Negative for fever and malaise/fatigue.   Eyes:  Negative for blurred vision.   Gastrointestinal:  Negative for nausea and vomiting.   Musculoskeletal:  Positive for neck pain (R side of neck feels tense).   Neurological:  Positive for headaches (Intermittent).       PAST MEDICAL HISTORY  Patient Active Problem List    Diagnosis Date Noted    Traumatic hemorrhage of retina s/p retinal surgery 1/2022 04/21/2022    Nodule of finger of right hand 04/21/2022    Overweight 04/21/2022    History of smoking for 6-10 years 04/21/2022    Annual physical exam 04/21/2022       SURGICAL HISTORY  patient denies any surgical history    ALLERGIES  Allergies[1]    CURRENT MEDICATIONS  Home Medications       Reviewed by Mirela De León P.A.-C. (Physician Assistant) on 05/15/25 at 1740  Med List Status:  <None>     Medication Last Dose Status   azithromycin (ZITHROMAX) 250 MG Tab Not Taking Flagged for Removal   benzonatate (TESSALON) 100 MG Cap Not Taking Flagged for Removal   fluticasone (FLONASE) 50 MCG/ACT nasal spray Not Taking Flagged for Removal                    SOCIAL HISTORY  Social History     Tobacco Use    Smoking status: Former     Current packs/day: 1.00     Average packs/day: 1 pack/day for 7.0 years (7.0 ttl pk-yrs)     Types: Cigarettes    Smokeless tobacco: Never    Tobacco comments:     quit 2019   Vaping Use    Vaping status: Never Used   Substance and Sexual Activity    Alcohol use: Never    Drug use: Never    Sexual activity: Not on file       FAMILY HISTORY  Family History   Problem Relation Age of Onset    Thyroid Mother         abnormality, unknown type of problem    Cancer Father         skin cancer on his foot that was removed, unknown type    No Known Problems Sister     No Known Problems Son           Objective:     VITAL SIGNS: /84 (BP Location: Left arm, Patient Position: Sitting, BP Cuff Size: Adult)   Pulse 89   Temp 37.4 °C (99.4 °F) (Temporal)   Resp 16   Wt 96.6 kg (213 lb)   SpO2 99%   BMI 30.56 kg/m²     PHYSICAL EXAM  Physical Exam  Vitals reviewed.   Constitutional:       General: He is not in acute distress.     Appearance: Normal appearance. He is not ill-appearing or toxic-appearing.   HENT:      Head: Normocephalic and atraumatic. No abrasion, contusion or laceration.      Right Ear: External ear normal.      Left Ear: External ear normal.      Nose: No congestion.      Mouth/Throat:      Mouth: Mucous membranes are moist.   Eyes:      Extraocular Movements: Extraocular movements intact.      Conjunctiva/sclera: Conjunctivae normal.      Pupils: Pupils are equal, round, and reactive to light.   Neck:        Comments: Right side of neck with sensation of tension. No TTP. No erythema.   Cardiovascular:      Rate and Rhythm: Normal rate.   Pulmonary:       Effort: Pulmonary effort is normal. No respiratory distress.   Musculoskeletal:      Cervical back: Full passive range of motion without pain. No torticollis. No pain with movement, spinous process tenderness or muscular tenderness. Normal range of motion.   Skin:     General: Skin is warm and dry.      Coloration: Skin is not pale.      Findings: No bruising.   Neurological:      General: No focal deficit present.      Mental Status: He is alert and oriented to person, place, and time.      Cranial Nerves: No dysarthria or facial asymmetry.      Gait: Gait is intact.      Comments: Patient is moving all 4 extremities normally   Psychiatric:         Mood and Affect: Mood normal.         Assessment/Plan:     1. Neck pain on right side  - cyclobenzaprine (FLEXERIL) 5 MG tablet; Take 1-2 Tablets by mouth at bedtime as needed for Muscle Spasms.  Dispense: 14 Tablet; Refill: 0    2. Acute nonintractable headache, unspecified headache type  - Rest and hydrate   - Tylenol/ibuprofen OTC as needed for headache  - Monitor symptoms at home and return to clinic if symptoms worsen or fail to resolve    MDM/Comments:  Patient has stable vital signs and is non-toxic appearing.  Patient presents with an intermittent right sided headache for the past 3 days.  Patient is not displaying signs of a cluster headache or migraine.  He does report right sided tension in his neck and feels that he may be experiencing a tension headache.  His symptoms have been gradually improving over the last several days.  Patient has been provided Flexeril to trial for neck tension.  Discussed supportive care with hydration, rest, Tylenol/Ibuprofen as needed. Patient demonstrated understanding of treatment plan at this time and will RTC if symptoms worsen or fail to resolve.  A [place]ese-speaking audio  was used to facilitate this visit.    Differential diagnosis, natural history, supportive care, and indications for immediate follow-up  discussed. All questions answered. Patient agrees with the plan of care.    Follow-up as needed if symptoms worsen or fail to improve to PCP, Urgent care or Emergency Room.    I have personally reviewed prior external notes and test results pertinent to today's visit.  I have independently reviewed and interpreted all diagnostics ordered during this urgent care acute visit.   Discussed management options (risks,benefits, and alternatives to treatment). Pt expresses understanding and the treatment plan was agreed upon. Questions were encouraged and answered to pt's satisfaction.    Please note that this dictation was created using voice recognition software. I have made a reasonable attempt to correct obvious errors, but I expect that there are errors of grammar and possibly content that I did not discover before finalizing the note.         [1] No Known Allergies